# Patient Record
Sex: FEMALE | Race: WHITE | URBAN - METROPOLITAN AREA
[De-identification: names, ages, dates, MRNs, and addresses within clinical notes are randomized per-mention and may not be internally consistent; named-entity substitution may affect disease eponyms.]

---

## 2017-12-11 ENCOUNTER — IMPORTED ENCOUNTER (OUTPATIENT)
Dept: URBAN - METROPOLITAN AREA CLINIC 50 | Facility: CLINIC | Age: 67
End: 2017-12-11

## 2017-12-27 ENCOUNTER — IMPORTED ENCOUNTER (OUTPATIENT)
Dept: URBAN - METROPOLITAN AREA CLINIC 50 | Facility: CLINIC | Age: 67
End: 2017-12-27

## 2017-12-27 NOTE — PATIENT DISCUSSION
"""Call if vision decreases or RD warning signs increases/RD warnings given. No signs of retinal tear. Retinal detachment precautions discussed.  """

## 2019-06-26 ENCOUNTER — IMPORTED ENCOUNTER (OUTPATIENT)
Dept: URBAN - METROPOLITAN AREA CLINIC 50 | Facility: CLINIC | Age: 69
End: 2019-06-26

## 2021-01-06 ENCOUNTER — IMPORTED ENCOUNTER (OUTPATIENT)
Dept: URBAN - METROPOLITAN AREA CLINIC 50 | Facility: CLINIC | Age: 71
End: 2021-01-06

## 2021-04-17 ASSESSMENT — VISUAL ACUITY
OD_CC: J2@ 17 IN
OD_CC: J2@ 18 IN
OD_SC: 20/30
OD_CC: J1+
OS_CC: J2@ 18 IN
OD_SC: 20/30
OD_SC: 20/30+2
OS_SC: 20/25
OS_CC: J2@ 17 IN
OS_SC: 20/30
OD_PH: 20/25-2
OD_PH: 20/25
OS_CC: J1+
OS_SC: 20/20-1
OS_PH: 20/25

## 2021-04-17 ASSESSMENT — TONOMETRY
OS_IOP_MMHG: 14
OS_IOP_MMHG: 15
OD_IOP_MMHG: 14
OS_IOP_MMHG: 15
OD_IOP_MMHG: 15
OD_IOP_MMHG: 15

## 2021-12-29 ENCOUNTER — PREPPED CHART (OUTPATIENT)
Dept: URBAN - METROPOLITAN AREA CLINIC 50 | Facility: CLINIC | Age: 71
End: 2021-12-29

## 2022-01-05 ENCOUNTER — ESTABLISHED PATIENT (OUTPATIENT)
Dept: URBAN - METROPOLITAN AREA CLINIC 50 | Facility: CLINIC | Age: 72
End: 2022-01-05

## 2022-01-05 DIAGNOSIS — H26.493: ICD-10-CM

## 2022-01-05 DIAGNOSIS — H43.813: ICD-10-CM

## 2022-01-05 DIAGNOSIS — H16.223: ICD-10-CM

## 2022-01-05 DIAGNOSIS — E11.9: ICD-10-CM

## 2022-01-05 PROCEDURE — 92014 COMPRE OPH EXAM EST PT 1/>: CPT

## 2022-01-05 ASSESSMENT — VISUAL ACUITY
OU_CC: J1@16IN
OD_SC: 20/30
OS_GLARE: 20/50
OS_SC: 20/30-1
OU_SC: 20/25-2
OD_GLARE: 20/40
OD_PH: 20/25-1
OD_GLARE: 20/50
OS_GLARE: 20/70

## 2022-01-05 ASSESSMENT — KERATOMETRY
OS_AXISANGLE_DEGREES: 156
OS_K2POWER_DIOPTERS: 44.50
OS_K1POWER_DIOPTERS: 43.25
OD_AXISANGLE_DEGREES: 42
OD_K2POWER_DIOPTERS: 44.75
OS_AXISANGLE2_DEGREES: 66
OD_AXISANGLE2_DEGREES: 132
OD_K1POWER_DIOPTERS: 43.00

## 2022-01-05 ASSESSMENT — TONOMETRY
OS_IOP_MMHG: 14
OD_IOP_MMHG: 14

## 2022-01-05 NOTE — PATIENT DISCUSSION
PCO ([address] [place] [address]): Visually significant PCO present on exam today. Recommend YAG laser capsulotomy to improve vision and decrease glare symptoms. RBAs of procedure discussed. Patient agrees and wishes to proceed.

## 2022-01-13 ENCOUNTER — CLINIC PROCEDURE ONLY (OUTPATIENT)
Dept: URBAN - METROPOLITAN AREA CLINIC 53 | Facility: CLINIC | Age: 72
End: 2022-01-13

## 2022-01-13 DIAGNOSIS — H26.491: ICD-10-CM

## 2022-01-13 PROCEDURE — 66821 AFTER CATARACT LASER SURGERY: CPT

## 2022-01-13 ASSESSMENT — KERATOMETRY
OS_K2POWER_DIOPTERS: 44.50
OD_AXISANGLE2_DEGREES: 132
OD_K1POWER_DIOPTERS: 43.00
OS_AXISANGLE2_DEGREES: 66
OS_AXISANGLE_DEGREES: 156
OD_AXISANGLE_DEGREES: 42
OS_K1POWER_DIOPTERS: 43.25
OD_K2POWER_DIOPTERS: 44.75

## 2022-01-13 ASSESSMENT — TONOMETRY
OD_IOP_MMHG: 13
OS_IOP_MMHG: 13

## 2022-01-13 ASSESSMENT — VISUAL ACUITY
OD_SC: 20/30
OS_PH: 20/25
OS_SC: 20/30
OD_PH: 20/25-2

## 2022-01-13 NOTE — PROCEDURE NOTE: CLINICAL
PROCEDURE NOTE: YAG Capsulotomy OD. Diagnosis: Posterior Capsular Opacification (PCO). Prep: Mydriacil 1% and Phenylephrine 2.5%. Prior to treatment, the risks/benefits/alternatives were discussed. The patient wished to proceed with procedure. Consent was signed. Proparacaine and brominidine were placed into the operative eye after the eye was dilated. Power = 3.0mJ. Number of pulses = 22. Patient tolerated procedure well and there were no complications. Post Laser instructions given. Smith Thakur

## 2022-01-13 NOTE — PATIENT DISCUSSION
PCO ([id number] [place] [address]): Visually significant PCO present on exam today. Recommend YAG laser capsulotomy to improve vision and decrease glare symptoms. RBAs of procedure discussed. Patient agrees and wishes to proceed.

## 2022-01-27 ENCOUNTER — CLINIC PROCEDURE ONLY (OUTPATIENT)
Dept: URBAN - METROPOLITAN AREA CLINIC 53 | Facility: CLINIC | Age: 72
End: 2022-01-27

## 2022-01-27 DIAGNOSIS — H26.492: ICD-10-CM

## 2022-01-27 PROCEDURE — 66821 AFTER CATARACT LASER SURGERY: CPT

## 2022-01-27 ASSESSMENT — KERATOMETRY
OD_K1POWER_DIOPTERS: 43.00
OD_AXISANGLE_DEGREES: 42
OD_AXISANGLE2_DEGREES: 132
OS_AXISANGLE_DEGREES: 156
OS_K1POWER_DIOPTERS: 43.25
OD_K2POWER_DIOPTERS: 44.75
OS_AXISANGLE2_DEGREES: 66
OS_K2POWER_DIOPTERS: 44.50

## 2022-01-27 ASSESSMENT — TONOMETRY
OS_IOP_MMHG: 13
OD_IOP_MMHG: 13

## 2022-01-27 ASSESSMENT — VISUAL ACUITY
OD_PH: 20/25-2
OS_SC: 20/30-2
OD_SC: 20/30

## 2022-01-27 NOTE — PROCEDURE NOTE: CLINICAL
PROCEDURE NOTE: YAG Capsulotomy OS. Diagnosis: Posterior Capsular Opacification (PCO). Prep: Mydriacil 1% and Phenylephrine 2.5%. Prior to treatment, the risks/benefits/alternatives were discussed. The patient wished to proceed with procedure. Consent was signed. Proparacaine and brominidine were placed into the operative eye after the eye was dilated. Power = 3.0mJ. Number of pulses = 17. Patient tolerated procedure well and there were no complications. Post Laser instructions given. Cameron Jang

## 2022-03-03 ENCOUNTER — POST-OP (OUTPATIENT)
Dept: URBAN - METROPOLITAN AREA CLINIC 50 | Facility: CLINIC | Age: 72
End: 2022-03-03

## 2022-03-03 DIAGNOSIS — Z98.890: ICD-10-CM

## 2022-03-03 PROCEDURE — 99024 POSTOP FOLLOW-UP VISIT: CPT

## 2022-03-03 ASSESSMENT — TONOMETRY
OD_IOP_MMHG: 12
OS_IOP_MMHG: 10

## 2022-03-03 ASSESSMENT — KERATOMETRY
OS_AXISANGLE_DEGREES: 152
OD_AXISANGLE2_DEGREES: 127
OD_AXISANGLE_DEGREES: 37
OD_K2POWER_DIOPTERS: 44.50
OS_AXISANGLE2_DEGREES: 62
OD_K1POWER_DIOPTERS: 43.25
OS_K2POWER_DIOPTERS: 44.25
OS_K1POWER_DIOPTERS: 43.25

## 2022-03-03 ASSESSMENT — VISUAL ACUITY
OS_CC: J1 @ 14IN
OD_CC: J2 @ 14IN
OU_CC: J1 @ 14IN
OD_SC: 20/30
OS_SC: 20/25-2
OU_SC: 20/25-1

## 2023-04-19 ENCOUNTER — COMPREHENSIVE EXAM (OUTPATIENT)
Dept: URBAN - METROPOLITAN AREA CLINIC 50 | Facility: CLINIC | Age: 73
End: 2023-04-19

## 2023-04-19 DIAGNOSIS — H43.813: ICD-10-CM

## 2023-04-19 DIAGNOSIS — E11.9: ICD-10-CM

## 2023-04-19 DIAGNOSIS — H16.223: ICD-10-CM

## 2023-04-19 PROCEDURE — 92014 COMPRE OPH EXAM EST PT 1/>: CPT

## 2023-04-19 ASSESSMENT — KERATOMETRY
OS_AXISANGLE2_DEGREES: 62
OS_K2POWER_DIOPTERS: 44.25
OD_AXISANGLE_DEGREES: 37
OS_AXISANGLE_DEGREES: 152
OD_K1POWER_DIOPTERS: 43.25
OS_K1POWER_DIOPTERS: 43.25
OD_K2POWER_DIOPTERS: 44.50
OD_AXISANGLE2_DEGREES: 127

## 2023-04-19 ASSESSMENT — TONOMETRY
OS_IOP_MMHG: 16
OD_IOP_MMHG: 16

## 2023-04-19 ASSESSMENT — VISUAL ACUITY
OS_SC: 20/25-1
OD_SC: 20/25-2
OU_CC: J1@16"
OU_SC: 20/25-2

## 2024-05-01 ENCOUNTER — COMPREHENSIVE EXAM (OUTPATIENT)
Dept: URBAN - METROPOLITAN AREA CLINIC 50 | Facility: LOCATION | Age: 74
End: 2024-05-01

## 2024-05-01 DIAGNOSIS — H16.223: ICD-10-CM

## 2024-05-01 DIAGNOSIS — H43.813: ICD-10-CM

## 2024-05-01 DIAGNOSIS — E11.9: ICD-10-CM

## 2024-05-01 PROCEDURE — 99214 OFFICE O/P EST MOD 30 MIN: CPT

## 2024-05-01 ASSESSMENT — VISUAL ACUITY
OS_SC: 20/25
OD_SC: 20/30
OD_PH: 20/25
OU_CC: J1

## 2024-05-01 ASSESSMENT — TONOMETRY
OS_IOP_MMHG: 14
OD_IOP_MMHG: 14